# Patient Record
Sex: MALE | NOT HISPANIC OR LATINO | URBAN - METROPOLITAN AREA
[De-identification: names, ages, dates, MRNs, and addresses within clinical notes are randomized per-mention and may not be internally consistent; named-entity substitution may affect disease eponyms.]

---
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
## 2021-06-01 VITALS — HEIGHT: 66 IN | WEIGHT: 112.3 LBS | BODY MASS INDEX: 18.05 KG/M2
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
